# Patient Record
Sex: FEMALE | Race: WHITE | URBAN - METROPOLITAN AREA
[De-identification: names, ages, dates, MRNs, and addresses within clinical notes are randomized per-mention and may not be internally consistent; named-entity substitution may affect disease eponyms.]

---

## 2022-03-24 ENCOUNTER — WEB ENCOUNTER (OUTPATIENT)
Dept: URBAN - METROPOLITAN AREA CLINIC 40 | Facility: CLINIC | Age: 67
End: 2022-03-24

## 2022-03-24 ENCOUNTER — OFFICE VISIT (OUTPATIENT)
Dept: URBAN - METROPOLITAN AREA CLINIC 40 | Facility: CLINIC | Age: 67
End: 2022-03-24
Payer: COMMERCIAL

## 2022-03-24 VITALS
TEMPERATURE: 98.6 F | BODY MASS INDEX: 30.16 KG/M2 | HEIGHT: 65 IN | DIASTOLIC BLOOD PRESSURE: 88 MMHG | HEART RATE: 72 BPM | WEIGHT: 181 LBS | SYSTOLIC BLOOD PRESSURE: 142 MMHG

## 2022-03-24 DIAGNOSIS — R74.8 ELEVATED LIVER ENZYMES: ICD-10-CM

## 2022-03-24 DIAGNOSIS — K21.9 GERD: ICD-10-CM

## 2022-03-24 PROCEDURE — 99203 OFFICE O/P NEW LOW 30 MIN: CPT | Performed by: PHYSICIAN ASSISTANT

## 2022-03-24 RX ORDER — SPIRONOLACTONE 50 MG/1
1 TABLET TABLET, FILM COATED ORAL ONCE A DAY
Status: ACTIVE | COMMUNITY

## 2022-03-24 RX ORDER — OMEPRAZOLE 40 MG/1
1 CAPSULE 30 MINUTES BEFORE MORNING MEAL CAPSULE, DELAYED RELEASE ORAL ONCE A DAY
Status: ACTIVE | COMMUNITY

## 2022-03-24 RX ORDER — AMLODIPINE BESYLATE 5 MG/1
1 TABLET TABLET ORAL ONCE A DAY
Status: ACTIVE | COMMUNITY

## 2022-03-24 RX ORDER — OMEPRAZOLE 40 MG/1
1 CAPSULE 30 MINUTES BEFORE MORNING MEAL CAPSULE, DELAYED RELEASE ORAL BID
Qty: 60 | Refills: 1 | OUTPATIENT

## 2022-03-24 NOTE — HPI-TODAY'S VISIT:
Ms. Marrero is a 66 year old female White female who presents to the office today for evaluation of elevated liver enzymes and fatty liver.  Recent blood work from February 2022 with essentially normal CBC.  CMP with mild elevation of AST of 59, ALT 54, mildly increased alk phos of 125 and normal total bilirubin.  Lipid panel with slightly elevated LDL of 126, otherwise normal.  Viral hepatitis panel was negative.  Hemoglobin A1c elevated at 6.5%.  Normal TSH.  No evidence of immunity to hepatitis B.  HIV negative.  Patient is a former smoker.  No family history of colorectal, or gastric malignancy. History of heartburn. Takes omeprazole daily. Smoker and obese. Admits her diet is fair.  Some physical activity.  No family history of liver cancer reported.  No family history of known liver disease.  No recent ultrasound imaging to confirm fatty liver though this is been suspected.  Does not consume alcohol. Fair diet and has GERD symptoms despite once daily omeprazole. States she has been told for some time she did have mildly elevated liver enzymes.  Has a brother who unfortunately passed away with hepatitis B.

## 2022-04-08 ENCOUNTER — OFFICE VISIT (OUTPATIENT)
Dept: URBAN - METROPOLITAN AREA CLINIC 73 | Facility: CLINIC | Age: 67
End: 2022-04-08
Payer: COMMERCIAL

## 2022-04-08 DIAGNOSIS — K76.0 HEPATIC STEATOSIS: ICD-10-CM

## 2022-04-08 PROCEDURE — 76705 ECHO EXAM OF ABDOMEN: CPT | Performed by: INTERNAL MEDICINE

## 2022-04-08 RX ORDER — AMLODIPINE BESYLATE 5 MG/1
1 TABLET TABLET ORAL ONCE A DAY
Status: ACTIVE | COMMUNITY

## 2022-04-08 RX ORDER — OMEPRAZOLE 40 MG/1
1 CAPSULE 30 MINUTES BEFORE MORNING MEAL CAPSULE, DELAYED RELEASE ORAL BID
Qty: 60 | Refills: 1 | Status: ACTIVE | COMMUNITY

## 2022-04-08 RX ORDER — SPIRONOLACTONE 50 MG/1
1 TABLET TABLET, FILM COATED ORAL ONCE A DAY
Status: ACTIVE | COMMUNITY

## 2022-04-14 LAB
ACTIN (SMOOTH MUSCLE) ANTIBODY: 12
ALPHA-1-ANTITRYPSIN, SERUM: 165
ANA DIRECT: NEGATIVE
CERULOPLASMIN: 18.7
DEAMIDATED GLIADIN ABS, IGA: 4
DEAMIDATED GLIADIN ABS, IGG: 3
ENDOMYSIAL ANTIBODY IGA: NEGATIVE
HEREDITARY  HEMOCHROMATOSIS: (no result)
IGG, IMMUNOGLOBULIN G (RDL): 1230
IMMUNOGLOBULIN A, QN, SERUM: 312
IMMUNOGLOBULIN M, QN, SERUM: 232
MITOCHONDRIAL (M2) ANTIBODY: <20
T-TRANSGLUTAMINASE (TTG) IGA: <2
T-TRANSGLUTAMINASE (TTG) IGG: 3

## 2022-05-02 ENCOUNTER — DASHBOARD ENCOUNTERS (OUTPATIENT)
Age: 67
End: 2022-05-02

## 2022-05-02 ENCOUNTER — ERX REFILL RESPONSE (OUTPATIENT)
Dept: URBAN - METROPOLITAN AREA CLINIC 40 | Facility: CLINIC | Age: 67
End: 2022-05-02

## 2022-05-02 RX ORDER — OMEPRAZOLE 40 MG/1
TAKE 1 CAPSULE BY MOUTH TWICE A DAY CAPSULE, DELAYED RELEASE ORAL
Qty: 60 CAPSULE | Refills: 2 | OUTPATIENT

## 2022-05-02 RX ORDER — OMEPRAZOLE 40 MG/1
1 CAPSULE 30 MINUTES BEFORE MORNING MEAL CAPSULE, DELAYED RELEASE ORAL BID
Qty: 60 | Refills: 1 | OUTPATIENT

## 2022-05-05 ENCOUNTER — OFFICE VISIT (OUTPATIENT)
Dept: URBAN - METROPOLITAN AREA CLINIC 40 | Facility: CLINIC | Age: 67
End: 2022-05-05
Payer: COMMERCIAL

## 2022-05-05 VITALS
SYSTOLIC BLOOD PRESSURE: 112 MMHG | HEIGHT: 65 IN | BODY MASS INDEX: 30.49 KG/M2 | HEART RATE: 79 BPM | TEMPERATURE: 98.4 F | DIASTOLIC BLOOD PRESSURE: 72 MMHG | WEIGHT: 183 LBS

## 2022-05-05 DIAGNOSIS — R74.8 ELEVATED LIVER ENZYMES: ICD-10-CM

## 2022-05-05 DIAGNOSIS — E66.9 OBESITY (BMI 30-39.9): ICD-10-CM

## 2022-05-05 DIAGNOSIS — K76.0 FATTY LIVER: ICD-10-CM

## 2022-05-05 DIAGNOSIS — K21.9 GERD: ICD-10-CM

## 2022-05-05 PROCEDURE — 99213 OFFICE O/P EST LOW 20 MIN: CPT | Performed by: PHYSICIAN ASSISTANT

## 2022-05-05 RX ORDER — OMEPRAZOLE 40 MG/1
TAKE 1 CAPSULE BY MOUTH TWICE A DAY CAPSULE, DELAYED RELEASE ORAL
Qty: 60 CAPSULE | Refills: 2 | Status: ACTIVE | COMMUNITY

## 2022-05-05 RX ORDER — AMLODIPINE BESYLATE 5 MG/1
1 TABLET TABLET ORAL ONCE A DAY
Status: ACTIVE | COMMUNITY

## 2022-05-05 RX ORDER — SPIRONOLACTONE 50 MG/1
1 TABLET TABLET, FILM COATED ORAL ONCE A DAY
Status: ACTIVE | COMMUNITY

## 2022-05-05 NOTE — HPI-TODAY'S VISIT:
Ms. Marrero is a 66 year old female White female last seen 3/24/22, who returns to the office today for evaluation of elevated liver enzymes and fatty liver.  Recent blood work from February 2022 with essentially normal CBC.  CMP with mild elevation of AST of 59, ALT 54, mildly increased alk phos of 125 and normal total bilirubin.  Lipid panel with slightly elevated LDL of 126, otherwise normal.  Viral hepatitis panel was negative.  Hemoglobin A1c elevated at 6.5%.  Normal TSH.  No evidence of immunity to hepatitis B.  HIV negative.  Patient is a former smoker.  No family history of colorectal, or gastric malignancy. History of heartburn. Takes omeprazole daily. Smoker and obese. Admits her diet is fair.  Some physical activity.  No family history of liver cancer reported.  No family history of known liver disease.  No recent ultrasound imaging to confirm fatty liver though this is been suspected.  Does not consume alcohol. Fair diet and has GERD symptoms despite once daily omeprazole. States she has been told for some time she did have mildly elevated liver enzymes.  Has a brother who unfortunately passed away with hepatitis B. PI trying to monitor diet she has been eating some unhealthy foods and getting mild depressed mood.  Tries to remain social and around friends.  No reported suicidal ideation.  Denies nausea vomiting or dysphagia.  No significant abdominal this time.  No recent screening colonoscopy but tells me that her primary medical doctor recommended she have it closer to home with another gastroenterologist as she has difficulty procuring transportation.  On her recent ultrasound completed on April 8, 2022, there was evidence of suspected hepatic steatosis.  No other focal lesions of the liver identified.  Normal gallbladder without gallstones.  Common bile duct normal.  With chronic liver disease panel celiac serology was normal as well as DALTON, AMA, ASMA, alpha-1 antitrypsin.  No genetic mutations with hemochromatosis profile.  IgG normal.  IgM mildly elevated at 232.  Ceruloplasmin minimally decreased at 18.7 with low range 19.0.

## 2022-05-06 PROBLEM — 414916001 OBESITY: Status: ACTIVE | Noted: 2022-05-05

## 2022-05-06 PROBLEM — 197321007 FATTY LIVER: Status: ACTIVE | Noted: 2022-05-05

## 2022-05-06 PROBLEM — 235595009 GASTROESOPHAGEAL REFLUX DISEASE: Status: ACTIVE | Noted: 2022-03-24

## 2022-05-12 ENCOUNTER — ERX REFILL RESPONSE (OUTPATIENT)
Dept: URBAN - METROPOLITAN AREA CLINIC 40 | Facility: CLINIC | Age: 67
End: 2022-05-12

## 2022-05-12 RX ORDER — OMEPRAZOLE 40 MG/1
TAKE 1 CAPSULE BY MOUTH TWICE A DAY CAPSULE, DELAYED RELEASE ORAL
Qty: 60 CAPSULE | Refills: 2 | OUTPATIENT

## 2022-05-12 RX ORDER — OMEPRAZOLE 40 MG/1
TAKE 1 CAPSULE BY MOUTH TWICE A DAY CAPSULE, DELAYED RELEASE ORAL
Qty: 180 CAPSULE | Refills: 1 | OUTPATIENT